# Patient Record
Sex: FEMALE | Race: AMERICAN INDIAN OR ALASKA NATIVE | NOT HISPANIC OR LATINO | URBAN - METROPOLITAN AREA
[De-identification: names, ages, dates, MRNs, and addresses within clinical notes are randomized per-mention and may not be internally consistent; named-entity substitution may affect disease eponyms.]

---

## 2020-01-14 ENCOUNTER — EMERGENCY (EMERGENCY)
Facility: HOSPITAL | Age: 55
LOS: 0 days | Discharge: HOME | End: 2020-01-14

## 2020-01-14 ENCOUNTER — EMERGENCY (EMERGENCY)
Facility: HOSPITAL | Age: 55
LOS: 0 days | Discharge: HOME | End: 2020-01-14
Admitting: EMERGENCY MEDICINE
Payer: COMMERCIAL

## 2020-01-14 VITALS
DIASTOLIC BLOOD PRESSURE: 56 MMHG | HEART RATE: 80 BPM | RESPIRATION RATE: 17 BRPM | TEMPERATURE: 98 F | OXYGEN SATURATION: 96 % | SYSTOLIC BLOOD PRESSURE: 119 MMHG

## 2020-01-14 VITALS
OXYGEN SATURATION: 100 % | WEIGHT: 126.1 LBS | DIASTOLIC BLOOD PRESSURE: 75 MMHG | RESPIRATION RATE: 18 BRPM | TEMPERATURE: 98 F | HEART RATE: 72 BPM | SYSTOLIC BLOOD PRESSURE: 131 MMHG | HEIGHT: 62 IN

## 2020-01-14 VITALS — HEIGHT: 62 IN
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
## 2020-01-14 DIAGNOSIS — Z02.9 ENCOUNTER FOR ADMINISTRATIVE EXAMINATIONS, UNSPECIFIED: ICD-10-CM
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
## 2020-01-14 DIAGNOSIS — M54.5 LOW BACK PAIN: ICD-10-CM
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
## 2020-01-14 DIAGNOSIS — M54.9 DORSALGIA, UNSPECIFIED: ICD-10-CM

## 2020-01-14 PROCEDURE — 99283 EMERGENCY DEPT VISIT LOW MDM: CPT

## 2020-01-14 RX ORDER — IBUPROFEN 200 MG
1 TABLET ORAL
Qty: 20 | Refills: 0
Start: 2020-01-14 | End: 2020-01-18

## 2020-01-14 RX ORDER — METHOCARBAMOL 500 MG/1
2 TABLET, FILM COATED ORAL
Qty: 12 | Refills: 0
Start: 2020-01-14 | End: 2020-01-15

## 2020-01-14 RX ORDER — METHOCARBAMOL 500 MG/1
1000 TABLET, FILM COATED ORAL ONCE
Refills: 0 | Status: COMPLETED | OUTPATIENT
Start: 2020-01-14 | End: 2020-01-14

## 2020-01-14 RX ORDER — KETOROLAC TROMETHAMINE 30 MG/ML
30 SYRINGE (ML) INJECTION ONCE
Refills: 0 | Status: DISCONTINUED | OUTPATIENT
Start: 2020-01-14 | End: 2020-01-14

## 2020-01-14 RX ADMIN — Medication 30 MILLIGRAM(S): at 08:02

## 2020-01-14 RX ADMIN — METHOCARBAMOL 1000 MILLIGRAM(S): 500 TABLET, FILM COATED ORAL at 08:01

## 2020-01-14 NOTE — ED PROVIDER NOTE - NSFOLLOWUPCLINICS_GEN_ALL_ED_FT
[facility] ([facility])  Rehabilitation  [facility] [address], [address] [place] [address]  Phone: [phone]  Fax:   Follow Up Time:

## 2020-01-14 NOTE — ED PROVIDER NOTE - PATIENT PORTAL LINK FT
You can access the FollowMyHealth Patient Portal offered by [facility] by registering at the following website: [email or web address]. By joining [facility]’s FollowMyHealth portal, you will also be able to view your health information using other applications (apps) compatible with our system.

## 2020-01-14 NOTE — ED PROVIDER NOTE - MUSCULOSKELETAL, MLM
+ left lumbar para-vertebral tenderness without spinous process tenderness; Spine appears normal, no joint tenderness

## 2020-01-14 NOTE — ED ADULT TRIAGE NOTE - CHIEF COMPLAINT QUOTE
pt sts pulled her back out, occurred last night 8pm. pt co left sided lower back pain, nonradiating, worsening with movement. no urinary symptoms.

## 2020-01-14 NOTE — ED ADULT NURSE NOTE - OBJECTIVE STATEMENT
pt presents with left lower back pain after lifting heavy object, pain started 3 days ago, worsens with movement. denies urinary symptoms

## 2020-01-14 NOTE — ED PROVIDER NOTE - OBJECTIVE STATEMENT
55 y/o F, no significant PMHx, presents to the ED with complaints of back pain x four days. Patient was carrying a 35lb container of oil from her car to her home when she developed an ache along her left lower back that has been intermittent since. She admits to exacerbation of pain upon movement; denies any radiating pain, abdominal pain, skin color changes/rash, urinary symptoms, bowel/bladder incontinence, gait abnormality and additional injuries. She took Ibuprofen 600mg yesterday with some symptomatic improvement.

## 2021-08-03 NOTE — ED PROVIDER NOTE - CPE EDP RESP NORM
normal... Finasteride Pregnancy And Lactation Text: This medication is absolutely contraindicated during pregnancy. It is unknown if it is excreted in breast milk.

## 2024-12-23 NOTE — ED ADULT TRIAGE NOTE - BMI (KG/M2)
[Normal] : Gait: normal [Estrada's Sign] : negative Estrada's sign [Pronator Drift] : negative pronator drift [SLR] : negative straight leg raise [de-identified] : 5 out of 5 motor strength, sensation is intact and symmetrical full range of motion flexion extension and rotation, no palpatory tenderness full range of motion of hips knees shoulders and elbows (all four extremities), no atrophy, negative straight leg raise, no pathological reflexes, no swelling, normal ambulation, no apparent distress skin is intact, no palpable lymph nodes, no upper or lower extremity instability, alert and oriented x3 and normal mood. Normal finger-to nose test.  No upper motor neuron findings. 23.1